# Patient Record
Sex: FEMALE | Race: BLACK OR AFRICAN AMERICAN | NOT HISPANIC OR LATINO | Employment: OTHER | URBAN - METROPOLITAN AREA
[De-identification: names, ages, dates, MRNs, and addresses within clinical notes are randomized per-mention and may not be internally consistent; named-entity substitution may affect disease eponyms.]

---

## 2023-05-30 ENCOUNTER — HOSPITAL ENCOUNTER (EMERGENCY)
Facility: HOSPITAL | Age: 58
Discharge: HOME | End: 2023-05-30
Attending: EMERGENCY MEDICINE | Admitting: EMERGENCY MEDICINE

## 2023-05-30 ENCOUNTER — APPOINTMENT (OUTPATIENT)
Dept: GENERAL RADIOLOGY | Facility: HOSPITAL | Age: 58
End: 2023-05-30

## 2023-05-30 VITALS
BODY MASS INDEX: 28.12 KG/M2 | OXYGEN SATURATION: 97 % | WEIGHT: 175 LBS | HEIGHT: 66 IN | TEMPERATURE: 97.2 F | DIASTOLIC BLOOD PRESSURE: 88 MMHG | HEART RATE: 84 BPM | RESPIRATION RATE: 16 BRPM | SYSTOLIC BLOOD PRESSURE: 137 MMHG

## 2023-05-30 DIAGNOSIS — E78.5 HYPERLIPIDEMIA, UNSPECIFIED HYPERLIPIDEMIA TYPE: ICD-10-CM

## 2023-05-30 DIAGNOSIS — E11.9 CONTROLLED TYPE 2 DIABETES MELLITUS WITHOUT COMPLICATION, UNSPECIFIED WHETHER LONG TERM INSULIN USE: ICD-10-CM

## 2023-05-30 DIAGNOSIS — R07.9 CHEST PAIN, UNSPECIFIED TYPE: Primary | ICD-10-CM

## 2023-05-30 DIAGNOSIS — I10 HYPERTENSION, UNSPECIFIED TYPE: ICD-10-CM

## 2023-05-30 LAB
ALBUMIN SERPL-MCNC: 4.2 G/DL (ref 3.5–5.2)
ALBUMIN/GLOB SERPL: 1.6 G/DL
ALP SERPL-CCNC: 68 U/L (ref 39–117)
ALT SERPL W P-5'-P-CCNC: 18 U/L (ref 1–33)
ANION GAP SERPL CALCULATED.3IONS-SCNC: 10.5 MMOL/L (ref 5–15)
AST SERPL-CCNC: 18 U/L (ref 1–32)
BASOPHILS # BLD AUTO: 0.05 10*3/MM3 (ref 0–0.2)
BASOPHILS NFR BLD AUTO: 0.5 % (ref 0–1.5)
BILIRUB SERPL-MCNC: 0.2 MG/DL (ref 0–1.2)
BUN SERPL-MCNC: 19 MG/DL (ref 6–20)
BUN/CREAT SERPL: 31.7 (ref 7–25)
CALCIUM SPEC-SCNC: 9.9 MG/DL (ref 8.6–10.5)
CHLORIDE SERPL-SCNC: 105 MMOL/L (ref 98–107)
CO2 SERPL-SCNC: 25.5 MMOL/L (ref 22–29)
CREAT SERPL-MCNC: 0.6 MG/DL (ref 0.57–1)
DEPRECATED RDW RBC AUTO: 46 FL (ref 37–54)
EGFRCR SERPLBLD CKD-EPI 2021: 104.2 ML/MIN/1.73
EOSINOPHIL # BLD AUTO: 0.19 10*3/MM3 (ref 0–0.4)
EOSINOPHIL NFR BLD AUTO: 1.9 % (ref 0.3–6.2)
ERYTHROCYTE [DISTWIDTH] IN BLOOD BY AUTOMATED COUNT: 15.2 % (ref 12.3–15.4)
GEN 5 2HR TROPONIN T REFLEX: 15 NG/L
GLOBULIN UR ELPH-MCNC: 2.6 GM/DL
GLUCOSE SERPL-MCNC: 144 MG/DL (ref 65–99)
HCT VFR BLD AUTO: 35 % (ref 34–46.6)
HGB BLD-MCNC: 11.6 G/DL (ref 12–15.9)
HOLD SPECIMEN: NORMAL
IMM GRANULOCYTES # BLD AUTO: 0.04 10*3/MM3 (ref 0–0.05)
IMM GRANULOCYTES NFR BLD AUTO: 0.4 % (ref 0–0.5)
LYMPHOCYTES # BLD AUTO: 2.69 10*3/MM3 (ref 0.7–3.1)
LYMPHOCYTES NFR BLD AUTO: 27.6 % (ref 19.6–45.3)
MCH RBC QN AUTO: 27.8 PG (ref 26.6–33)
MCHC RBC AUTO-ENTMCNC: 33.1 G/DL (ref 31.5–35.7)
MCV RBC AUTO: 83.7 FL (ref 79–97)
MONOCYTES # BLD AUTO: 0.52 10*3/MM3 (ref 0.1–0.9)
MONOCYTES NFR BLD AUTO: 5.3 % (ref 5–12)
NEUTROPHILS NFR BLD AUTO: 6.26 10*3/MM3 (ref 1.7–7)
NEUTROPHILS NFR BLD AUTO: 64.3 % (ref 42.7–76)
NRBC BLD AUTO-RTO: 0 /100 WBC (ref 0–0.2)
NT-PROBNP SERPL-MCNC: 14 PG/ML (ref 0–900)
PLATELET # BLD AUTO: 421 10*3/MM3 (ref 140–450)
PMV BLD AUTO: 10 FL (ref 6–12)
POTASSIUM SERPL-SCNC: 4 MMOL/L (ref 3.5–5.2)
PROT SERPL-MCNC: 6.8 G/DL (ref 6–8.5)
QT INTERVAL: 370 MS
RBC # BLD AUTO: 4.18 10*6/MM3 (ref 3.77–5.28)
SODIUM SERPL-SCNC: 141 MMOL/L (ref 136–145)
TROPONIN T DELTA: 5 NG/L
TROPONIN T SERPL HS-MCNC: 10 NG/L
WBC NRBC COR # BLD: 9.75 10*3/MM3 (ref 3.4–10.8)
WHOLE BLOOD HOLD COAG: NORMAL

## 2023-05-30 PROCEDURE — 36415 COLL VENOUS BLD VENIPUNCTURE: CPT

## 2023-05-30 PROCEDURE — 93005 ELECTROCARDIOGRAM TRACING: CPT | Performed by: EMERGENCY MEDICINE

## 2023-05-30 PROCEDURE — 71045 X-RAY EXAM CHEST 1 VIEW: CPT

## 2023-05-30 PROCEDURE — 83880 ASSAY OF NATRIURETIC PEPTIDE: CPT | Performed by: EMERGENCY MEDICINE

## 2023-05-30 PROCEDURE — 99284 EMERGENCY DEPT VISIT MOD MDM: CPT

## 2023-05-30 PROCEDURE — 85025 COMPLETE CBC W/AUTO DIFF WBC: CPT | Performed by: EMERGENCY MEDICINE

## 2023-05-30 PROCEDURE — 84484 ASSAY OF TROPONIN QUANT: CPT | Performed by: EMERGENCY MEDICINE

## 2023-05-30 PROCEDURE — 80053 COMPREHEN METABOLIC PANEL: CPT | Performed by: EMERGENCY MEDICINE

## 2023-05-30 RX ORDER — SODIUM CHLORIDE 0.9 % (FLUSH) 0.9 %
10 SYRINGE (ML) INJECTION AS NEEDED
Status: DISCONTINUED | OUTPATIENT
Start: 2023-05-30 | End: 2023-05-30 | Stop reason: HOSPADM

## 2023-05-30 NOTE — ED TRIAGE NOTES
"Pt comes to ER for palpitations with CP since yesterday. Pt states she has a hx of \"irregular\" rhythym.   "

## 2023-05-30 NOTE — ED PROVIDER NOTES
EMERGENCY DEPARTMENT ENCOUNTER    Room Number:  37/37  Date seen:  5/30/2023  PCP: Provider, No Known  Historian: Patient      HPI:  Chief Complaint: Chest pain  A complete HPI/ROS/PMH/PSH/SH/FH are unobtainable due to: Nothing  Context: Amaya Zavala is a 57 y.o. female who presents to the ED c/o chest discomfort that woke her from sleep this morning.  She reports that she had associated nausea and also felt short of breath.  Patient reports a history of hypertension, hyperlipidemia, diabetes.  She is not a smoker.  She does have a strong family history of coronary disease including 2 siblings.  She reports that the pain has resolved but she is still having some intermittent palpitations.  She tries to exercise regularly.  She reports that she actually did pretty strenuous exercise yesterday including some jumping jacks and lunges.  She denies having any exertional dyspnea or chest pain with her exercise routine.             PAST MEDICAL HISTORY  Active Ambulatory Problems     Diagnosis Date Noted   • No Active Ambulatory Problems     Resolved Ambulatory Problems     Diagnosis Date Noted   • No Resolved Ambulatory Problems     No Additional Past Medical History         PAST SURGICAL HISTORY  No past surgical history on file.      FAMILY HISTORY  No family history on file.      SOCIAL HISTORY  Social History     Socioeconomic History   • Marital status:          ALLERGIES  Ceclor [cefaclor], Lisinopril, and Penicillins        REVIEW OF SYSTEMS  Review of Systems   Review of all 14 systems is negative other than stated in the HPI above.      PHYSICAL EXAM  ED Triage Vitals   Temp Heart Rate Resp BP SpO2   05/30/23 1032 05/30/23 1032 05/30/23 1032 05/30/23 1048 05/30/23 1032   97.2 °F (36.2 °C) 98 18 132/88 97 %      Temp src Heart Rate Source Patient Position BP Location FiO2 (%)   -- -- -- -- --              Physical Exam      GENERAL: Awake and alert, well-appearing, no acute distress  HENT: lucho  patent  EYES: no scleral icterus, EOMI  CV: regular rhythm, normal rate, no murmur, no peripheral edema  RESPIRATORY: normal effort, lungs clear bilaterally  ABDOMEN: soft, nondistended, nontender throughout  MUSCULOSKELETAL: no deformity, no calf tenderness present bilaterally  NEURO: alert, moves all extremities, follows commands, cranial nerves II through XII intact  PSYCH:  calm, cooperative  SKIN: warm, dry    Vital signs and nursing notes reviewed.          LAB RESULTS  Recent Results (from the past 24 hour(s))   ECG 12 Lead Rhythm Change    Collection Time: 05/30/23 10:40 AM   Result Value Ref Range    QT Interval 370 ms   Comprehensive Metabolic Panel    Collection Time: 05/30/23 10:47 AM    Specimen: Blood   Result Value Ref Range    Glucose 144 (H) 65 - 99 mg/dL    BUN 19 6 - 20 mg/dL    Creatinine 0.60 0.57 - 1.00 mg/dL    Sodium 141 136 - 145 mmol/L    Potassium 4.0 3.5 - 5.2 mmol/L    Chloride 105 98 - 107 mmol/L    CO2 25.5 22.0 - 29.0 mmol/L    Calcium 9.9 8.6 - 10.5 mg/dL    Total Protein 6.8 6.0 - 8.5 g/dL    Albumin 4.2 3.5 - 5.2 g/dL    ALT (SGPT) 18 1 - 33 U/L    AST (SGOT) 18 1 - 32 U/L    Alkaline Phosphatase 68 39 - 117 U/L    Total Bilirubin 0.2 0.0 - 1.2 mg/dL    Globulin 2.6 gm/dL    A/G Ratio 1.6 g/dL    BUN/Creatinine Ratio 31.7 (H) 7.0 - 25.0    Anion Gap 10.5 5.0 - 15.0 mmol/L    eGFR 104.2 >60.0 mL/min/1.73   BNP    Collection Time: 05/30/23 10:47 AM    Specimen: Blood   Result Value Ref Range    proBNP 14.0 0.0 - 900.0 pg/mL   High Sensitivity Troponin T    Collection Time: 05/30/23 10:47 AM    Specimen: Blood   Result Value Ref Range    HS Troponin T 10 (H) <10 ng/L   CBC Auto Differential    Collection Time: 05/30/23 10:47 AM    Specimen: Blood   Result Value Ref Range    WBC 9.75 3.40 - 10.80 10*3/mm3    RBC 4.18 3.77 - 5.28 10*6/mm3    Hemoglobin 11.6 (L) 12.0 - 15.9 g/dL    Hematocrit 35.0 34.0 - 46.6 %    MCV 83.7 79.0 - 97.0 fL    MCH 27.8 26.6 - 33.0 pg    MCHC 33.1 31.5 -  35.7 g/dL    RDW 15.2 12.3 - 15.4 %    RDW-SD 46.0 37.0 - 54.0 fl    MPV 10.0 6.0 - 12.0 fL    Platelets 421 140 - 450 10*3/mm3    Neutrophil % 64.3 42.7 - 76.0 %    Lymphocyte % 27.6 19.6 - 45.3 %    Monocyte % 5.3 5.0 - 12.0 %    Eosinophil % 1.9 0.3 - 6.2 %    Basophil % 0.5 0.0 - 1.5 %    Immature Grans % 0.4 0.0 - 0.5 %    Neutrophils, Absolute 6.26 1.70 - 7.00 10*3/mm3    Lymphocytes, Absolute 2.69 0.70 - 3.10 10*3/mm3    Monocytes, Absolute 0.52 0.10 - 0.90 10*3/mm3    Eosinophils, Absolute 0.19 0.00 - 0.40 10*3/mm3    Basophils, Absolute 0.05 0.00 - 0.20 10*3/mm3    Immature Grans, Absolute 0.04 0.00 - 0.05 10*3/mm3    nRBC 0.0 0.0 - 0.2 /100 WBC   Gold Top - SST    Collection Time: 05/30/23 10:47 AM   Result Value Ref Range    Extra Tube Hold for add-ons.    Light Blue Top    Collection Time: 05/30/23 10:47 AM   Result Value Ref Range    Extra Tube Hold for add-ons.    High Sensitivity Troponin T 2Hr    Collection Time: 05/30/23 12:53 PM    Specimen: Blood   Result Value Ref Range    HS Troponin T 15 (H) <10 ng/L    Troponin T Delta 5 (C) >=-4 - <+4 ng/L       Ordered the above labs and reviewed the results.        RADIOLOGY  XR Chest 1 View    Result Date: 5/30/2023  XR CHEST 1 VW-  HISTORY: Female who is 57 years-old,  chest pain  TECHNIQUE: Frontal view of the chest  COMPARISON: None available  FINDINGS: Heart size is normal. Aorta is calcified. Pulmonary vasculature is unremarkable.. Minimal likely atelectasis or scarring the lower lungs. No focal pulmonary consolidation, pleural effusion, or pneumothorax. No acute osseous process.      Minimal likely atelectasis or scarring in the lower lungs. Follow-up as clinical indications persist.  This report was finalized on 5/30/2023 12:15 PM by Dr. Ethan Keane M.D.        Ordered the above noted radiological studies. Reviewed by me in PACS.            PROCEDURES  Procedures              MEDICATIONS GIVEN IN ER  Medications   sodium chloride 0.9 % flush  10 mL (has no administration in time range)                   MEDICAL DECISION MAKING, PROGRESS, and CONSULTS    All labs have been independently reviewed by me.  All radiology studies have been reviewed by me and I have also reviewed the radiology report.   EKG's independently viewed and interpreted by me.  Discussion below represents my analysis of pertinent findings related to patient's condition, differential diagnosis, treatment plan and final disposition.          Orders placed during this visit:  Orders Placed This Encounter   Procedures   • XR Chest 1 View   • Comprehensive Metabolic Panel   • BNP   • High Sensitivity Troponin T   • CBC Auto Differential   • High Sensitivity Troponin T 2Hr   • Ambulatory Referral to Cardiology   • Pulse Oximetry, Continuous   • Monitor Blood Pressure   • ECG 12 Lead Rhythm Change   • Insert Peripheral IV   • CBC & Differential   • Extra Tubes   • Gold Top - SST   • Light Blue Top             Differential diagnosis includes but is not limited to:    Acute coronary syndrome  Spontaneous coronary artery dissection  GERD  Pneumonia  Pulmonary embolus  Pneumothorax        Independent interpretation of labs, radiology studies, and discussions with consultants:  ED Course as of 05/30/23 1555   Tue May 30, 2023   1218 EKG          EKG time: 10:40 AM  Rhythm/Rate: Sinus rhythm, 76  P waves and GA: Normal  QRS, axis: Normal axis  ST and T waves: No acute ischemic changes    Interpreted Contemporaneously by me, independently viewed         [JR]   1221 HEART score 4 due to risk factors, age, and history. [JR]   1325 HS Troponin T(!): 15 [JR]   1325 Troponin T Delta(!!): 5 [JR]   1345 Patient reassessed.  I explained that her repeat troponin had gone up slightly and that I do think that she needs further evaluation by cardiology for her symptoms although she is pain-free at this time.  I offered admission to the observation unit for further evaluation tomorrow.  Patient would really like  to go home and carry out further testing as outpatient if possible.  I explained that I can refer her for further evaluation as outpatient but it may take up to 1 week to have her evaluated and that the quickest method would be admission to the observation unit.  Considering that her pain has resolved, I do not think is unreasonable for her to follow-up as outpatient, so long as she realizes that any recurrent discomfort, nausea, diaphoresis, or dyspnea would be an indication to return to the ER immediately.  She voiced understanding of these return precautions and agrees to follow-up closely with cardiology.  Referral will be placed. [JR]      ED Course User Index  [JR] Skyler Keyes MD                 DIAGNOSIS  Final diagnoses:   Chest pain, unspecified type   Controlled type 2 diabetes mellitus without complication, unspecified whether long term insulin use   Hypertension, unspecified type   Hyperlipidemia, unspecified hyperlipidemia type         DISPOSITION  DISCHARGE    Patient discharged in stable condition.    Reviewed implications of results, diagnosis, meds, responsibility to follow up, warning signs and symptoms of possible worsening, potential complications and reasons to return to ER.    Patient/Family voiced understanding of above instructions.    Discussed plan for discharge, as there is no emergent indication for admission. Patient referred to primary care provider for BP management due to today's BP. Pt/family is agreeable and understands need for follow up and repeat testing.  Pt is aware that discharge does not mean that nothing is wrong but it indicates no emergency is present that requires admission and they must continue care with follow-up as given below or physician of their choice.     FOLLOW-UP  [facility] CARDIOLOGY  [address]  [facility] [address] [phone]  Schedule an appointment as soon as possible for a visit            Medication  List      No changes were made to your prescriptions during this visit.                   Latest Documented Vital Signs:  As of 15:55 EDT  BP- 137/88 HR- 84 Temp- 97.2 °F (36.2 °C) O2 sat- 97%              --    Please note that portions of this were completed with a voice recognition program.       Note Disclaimer: At [facility], we believe that sharing information builds trust and better relationships. You are receiving this note because you are receiving care at [facility] or recently visited. It is possible you will see health information before a provider has talked with you about it. This kind of information can be easy to misunderstand. To help you fully understand what it means for your health, we urge you to discuss this note with your provider.           Skyler Keyes MD  05/30/23 5967

## 2023-06-19 PROBLEM — I10 HYPERTENSIVE DISORDER: Status: ACTIVE | Noted: 2020-12-18

## 2023-06-19 PROBLEM — Z12.39 SCREENING FOR BREAST CANCER: Status: ACTIVE | Noted: 2021-02-15

## 2023-06-19 PROBLEM — M77.8 LEFT SHOULDER TENDINITIS: Status: ACTIVE | Noted: 2017-01-05

## 2023-06-19 PROBLEM — M77.8 TENDINITIS OF LEFT SHOULDER: Status: ACTIVE | Noted: 2017-01-05

## 2023-06-19 PROBLEM — M70.61 TROCHANTERIC BURSITIS OF RIGHT HIP: Status: ACTIVE | Noted: 2017-06-05

## 2023-06-19 PROBLEM — R25.2 SPASM: Status: ACTIVE | Noted: 2021-02-15

## 2023-06-19 PROBLEM — Z91.148: Status: ACTIVE | Noted: 2021-03-19

## 2023-06-19 PROBLEM — J45.909 ASTHMA: Status: ACTIVE | Noted: 2020-12-18

## 2023-06-19 PROBLEM — F32.A DEPRESSIVE DISORDER: Status: ACTIVE | Noted: 2021-02-16

## 2023-06-30 PROBLEM — R07.2 PRECORDIAL PAIN: Status: ACTIVE | Noted: 2023-06-30

## 2023-11-07 NOTE — H&P
Chief Complaint  Right follow-up  Patient's Care Team  Primary Care Provider (Primary Insurance): JOHANN CHILEL: [address] [place] [address], Ph [phone]  Primary Care Provider: [facility]: [address] [place] [address], Ph [phone], Fax [phone]  Patient's Pharmacies  [facility] (ERX): [address] [facility] ([place] [address], Ph [phone], Fax [phone]  Vitals  2023-09-29 08:08  Ht: 5 ft 6 in  Allergies  Allergies not reviewed (last reviewed 09/14/2023)  CHRISTAL high criticality: Rash (Severe)   LISINOPRIL, high criticality: Rash (Severe)   SULFA (SULFONAMIDE ANTIBIOTICS): - Critical  Category: Drug   Uncoded Allergies  SEAFOOD (Critical) 11/18/2021 (Active)  Medications  Medications not reviewed (last reviewed 09/14/2023)  Advair Diskus 250 mcg-50 mcg/dose powder for inhalation  INHALE 1 DOSE BY MOUTH TWICE DAILY IN THE MORNING AND EVENING APPROXIMATELY 12 HOURS APART.  09/12/23   filled surescripts  albuterol sulfate HFA 90 mcg/actuation aerosol inhaler  INHALE 2 PUFFS BY MOUTH EVERY 4 TO 6 HOURS AS NEEDED  08/15/23   filled surescripts  aspirin 81 mg tablet,delayed release  TAKE 1 TABLET BY MOUTH ONCE DAILY  08/15/23   filled surescripts  cetirizine 10 mg tablet  TAKE 1 TABLET BY MOUTH ONCE DAILY  09/12/23   filled surescripts  cholecalciferol (vitamin D3) 25 mcg (1,000 unit) tablet  TAKE 1 TABLET BY MOUTH ONCE DAILY  09/17/23   filled surescripts  diazePAM 5 mg tablet  TAKE 1 TABLET BY MOUTH 1 HOUR PRIOR TO APPT  09/15/23   filled surescripts  diclofenac 1 % topical gel  APPLY 2 GRAMS TOPICALLY TO AFFECTED AREAS FOUR TIMES DAILY.  08/15/23   filled surescripts  fluticasone propionate 50 mcg/actuation nasal spray,suspension  INSTILL 1 SPRAY IN EACH NOSTRIL EVERY DAY AS NEEDED  03/03/22   filled surescripts  FreeStyle Lancets 28 gauge  TEST TWICE DAILY  03/03/22   filled surescripts  ibuprofen 400 mg tablet  TAKE 1 TABLET  BY MOUTH EVERY 6 HOURS AS NEEDED  03/09/22   filled surescripts  Jardiance 10 mg tablet  TAKE 1 TABLET BY MOUTH ONCE DAILY IN THE MORNING  09/25/23   filled surescripts  losartan 25 mg tablet  TAKE 1 TABLET BY MOUTH ONCE DAILY  08/15/23   filled surescripts  meloxicam 15 mg tablet  TAKE 1 TABLET BY MOUTH ONCE DAILY  09/14/23   filled surescripts  metFORMIN 1,000 mg tablet  TAKE 1 TABLET BY MOUTH TWICE DAILY WITH MORNING MEAL AND WITH EVENING MEAL  09/01/23   filled surescripts  naproxen 500 mg tablet  TAKE 1 TABLET BY MOUTH EVERY 12 HOURS AS NEEDED FOR PAIN  10/27/21   filled surescripts  nitrofurantoin monohydrate/macrocrystals 100 mg capsule  TAKE 1 CAPSULE BY MOUTH TWICE DAILY  07/07/21   filled surescripts  omeprazole 20 mg capsule,delayed release  TAKE 1 CAPSULE BY MOUTH EVERY DAY 30 MINUTES BEFORE A MEAL  09/05/23   filled surescripts  OneTouch Delica Plus Lancet 33 gauge  01/12/23   filled surescripts  OneTouch Verio Flex Meter  01/12/23   filled surescripts  OneTouch Verio test strips  USE TO TEST BLOOD SUGAR TWICE DAILY  01/12/23   filled surescripts  simvastatin 20 mg tablet  TAKE 1 TABLET BY MOUTH ONCE DAILY IN THE EVENING  08/18/23   filled surescripts  terbinafine  mg tablet  08/01/23   filled surescripts  Vaccines  Vaccines not reviewed (last reviewed 09/14/2023)  Vaccine Type Date Amt. Route Site NDC Lot # Mfr. Exp.  Date VIS VIS  Given Vaccinator  Influenza  influenza 11/11/22            Problems  Reviewed Problems  Anxiety - Onset: 09/15/2023  Partial thickness rotator cuff tear - Onset: 09/29/2023, Right  Pain of right shoulder joint - Onset: 04/04/2023  Impingement syndrome of right shoulder region - Onset: 04/04/2023  Family History  Family History not reviewed (last reviewed 09/14/2023)  Mother - Disorder of lung    - Heart disease    - Rheumatoid arthritis  Social History  Social History not reviewed (last reviewed 09/14/2023)  Public Health and Travel  Have you recently traveled  abroad?: No  Have you been to an area known to be high risk for COVID-19?: No  In the 14 days before symptom onset, have you had close contact with a laboratory-confirmed COVID-19 while that case was ill?: No  In the 14 days before symptom onset, have you had close contact with a person who is under investigation for COVID-19 while that person was ill?: No  Substance Use  Do you or have you ever smoked tobacco?: Former smoker  When did you quit smoking?: 16+ years since last cigarette  Do you or have you ever used any other forms of tobacco or nicotine?: No  Has tobacco cessation counseling been provided?: No  What is your level of alcohol consumption?: None  Do you use any illicit or recreational drugs?: No  Advance Directive  Do you have an advance directive?: Yes  Do you have a medical power of ?: Yes  Surgical History  Surgical History not reviewed (last reviewed 09/14/2023)  No surg 8/8/23  GYN History  (not configured)  Past Medical History  Past Medical History not reviewed (last reviewed 09/14/2023)  Asthma: Y  Diabetes: Y - type 2  Gastrointestinal Disease/GERD: Y  Hypertension: Y  HPI  Patient is a 58-year-old female here for evaluation of her right shoulder. The patient is a chief complaint of right shoulder pain is somewhat chronic in nature ongoing for the past 1 year. She had multiple injections and taking an anti-inflammatory with no significant relief. She is here to go over her MRI scan.    The pain is located over the anterolateral shoulder and the patient describes it as a intermittent, dull throbbing pain is worsened with overhead activity, lifting and occasionally at nighttime. The pain is mild to moderate in severity. The patient denies any numbness, tingling or weakness.  Assessment / Plan  MRI scan right shoulder  1. Broad partial-thickness tear of the supraspinatus  2. Impingement morphology    1. Impingement syndrome of right shoulder region  M75.41: Impingement syndrome of right  shoulder    2. Partial thickness rotator cuff tear - Right  M75.111: Incomplete rotator cuff tear or rupture of right shoulder, not specified as traumatic    Patient Instructions  With the current MRI findings and the failure of conservative treatment I would like to move forth a right shoulder arthroscopy for extensive debridement and rotator cuff repair. Today we discussed the procedure in detail. We discussed the need for physical therapy, immobilization with the sling and the expected recovery period. We discussed both the acute and long-term risks of the surgery. All questions were answered and informed consent was obtained.

## 2023-11-21 RX ORDER — SIMVASTATIN 20 MG
20 TABLET ORAL EVERY EVENING
COMMUNITY
Start: 2023-08-18

## 2023-11-21 RX ORDER — DIAZEPAM 5 MG/1
5 TABLET ORAL AS NEEDED
Status: ON HOLD | COMMUNITY
Start: 2023-09-15 | End: 2023-11-22

## 2023-11-21 RX ORDER — CHOLECALCIFEROL (VITAMIN D3) 25 MCG
1000 TABLET ORAL DAILY
COMMUNITY
Start: 2023-10-23

## 2023-11-21 NOTE — H&P
Chief Complaint  Right follow-up  Patient's Care Team  Primary Care Provider (Primary Insurance): JOHANN CHILEL: [address] [place] [address], Ph [phone]  Primary Care Provider: [facility]: [address] [place] [address], Ph [phone], Fax [phone]  Patient's Pharmacies  [facility] (ERX): [address] [facility] ([place] [address], Ph [phone], Fax [phone]  Vitals  2023-09-29 08:08  Ht: 5 ft 6 in  Allergies  Allergies not reviewed (last reviewed 09/14/2023)  CHRISTAL high criticality: Rash (Severe)   LISINOPRIL, high criticality: Rash (Severe)   SULFA (SULFONAMIDE ANTIBIOTICS): - Critical  Category: Drug   Uncoded Allergies  SEAFOOD (Critical) 11/18/2021 (Active)  Medications  Medications not reviewed (last reviewed 09/14/2023)  Advair Diskus 250 mcg-50 mcg/dose powder for inhalation  INHALE 1 DOSE BY MOUTH TWICE DAILY IN THE MORNING AND EVENING APPROXIMATELY 12 HOURS APART.  09/12/23   filled surescripts  albuterol sulfate HFA 90 mcg/actuation aerosol inhaler  INHALE 2 PUFFS BY MOUTH EVERY 4 TO 6 HOURS AS NEEDED  08/15/23   filled surescripts  aspirin 81 mg tablet,delayed release  TAKE 1 TABLET BY MOUTH ONCE DAILY  08/15/23   filled surescripts  cetirizine 10 mg tablet  TAKE 1 TABLET BY MOUTH ONCE DAILY  09/12/23   filled surescripts  cholecalciferol (vitamin D3) 25 mcg (1,000 unit) tablet  TAKE 1 TABLET BY MOUTH ONCE DAILY  09/17/23   filled surescripts  diazePAM 5 mg tablet  TAKE 1 TABLET BY MOUTH 1 HOUR PRIOR TO APPT  09/15/23   filled surescripts  diclofenac 1 % topical gel  APPLY 2 GRAMS TOPICALLY TO AFFECTED AREAS FOUR TIMES DAILY.  08/15/23   filled surescripts  fluticasone propionate 50 mcg/actuation nasal spray,suspension  INSTILL 1 SPRAY IN EACH NOSTRIL EVERY DAY AS NEEDED  03/03/22   filled surescripts  FreeStyle Lancets 28 gauge  TEST TWICE DAILY  03/03/22   filled surescripts  ibuprofen 400 mg tablet  TAKE 1 TABLET  BY MOUTH EVERY 6 HOURS AS NEEDED  03/09/22   filled surescripts  Jardiance 10 mg tablet  TAKE 1 TABLET BY MOUTH ONCE DAILY IN THE MORNING  09/25/23   filled surescripts  losartan 25 mg tablet  TAKE 1 TABLET BY MOUTH ONCE DAILY  08/15/23   filled surescripts  meloxicam 15 mg tablet  TAKE 1 TABLET BY MOUTH ONCE DAILY  09/14/23   filled surescripts  metFORMIN 1,000 mg tablet  TAKE 1 TABLET BY MOUTH TWICE DAILY WITH MORNING MEAL AND WITH EVENING MEAL  09/01/23   filled surescripts  naproxen 500 mg tablet  TAKE 1 TABLET BY MOUTH EVERY 12 HOURS AS NEEDED FOR PAIN  10/27/21   filled surescripts  nitrofurantoin monohydrate/macrocrystals 100 mg capsule  TAKE 1 CAPSULE BY MOUTH TWICE DAILY  07/07/21   filled surescripts  omeprazole 20 mg capsule,delayed release  TAKE 1 CAPSULE BY MOUTH EVERY DAY 30 MINUTES BEFORE A MEAL  09/05/23   filled surescripts  OneTouch Delica Plus Lancet 33 gauge  01/12/23   filled surescripts  OneTouch Verio Flex Meter  01/12/23   filled surescripts  OneTouch Verio test strips  USE TO TEST BLOOD SUGAR TWICE DAILY  01/12/23   filled surescripts  simvastatin 20 mg tablet  TAKE 1 TABLET BY MOUTH ONCE DAILY IN THE EVENING  08/18/23   filled surescripts  terbinafine  mg tablet  08/01/23   filled surescripts  Vaccines  Vaccines not reviewed (last reviewed 09/14/2023)  Vaccine Type Date Amt. Route Site NDC Lot # Mfr. Exp.  Date VIS VIS  Given Vaccinator  Influenza  influenza 11/11/22            Problems  Reviewed Problems  Anxiety - Onset: 09/15/2023  Partial thickness rotator cuff tear - Onset: 09/29/2023, Right  Pain of right shoulder joint - Onset: 04/04/2023  Impingement syndrome of right shoulder region - Onset: 04/04/2023  Family History  Family History not reviewed (last reviewed 09/14/2023)  Mother - Disorder of lung    - Heart disease    - Rheumatoid arthritis  Social History  Social History not reviewed (last reviewed 09/14/2023)  Public Health and Travel  Have you recently traveled  abroad?: No  Have you been to an area known to be high risk for COVID-19?: No  In the 14 days before symptom onset, have you had close contact with a laboratory-confirmed COVID-19 while that case was ill?: No  In the 14 days before symptom onset, have you had close contact with a person who is under investigation for COVID-19 while that person was ill?: No  Substance Use  Do you or have you ever smoked tobacco?: Former smoker  When did you quit smoking?: 16+ years since last cigarette  Do you or have you ever used any other forms of tobacco or nicotine?: No  Has tobacco cessation counseling been provided?: No  What is your level of alcohol consumption?: None  Do you use any illicit or recreational drugs?: No  Advance Directive  Do you have an advance directive?: Yes  Do you have a medical power of ?: Yes  Surgical History  Surgical History not reviewed (last reviewed 09/14/2023)  No surg 8/8/23  GYN History  (not configured)  Past Medical History  Past Medical History not reviewed (last reviewed 09/14/2023)  Asthma: Y  Diabetes: Y - type 2  Gastrointestinal Disease/GERD: Y  Hypertension: Y  HPI  Patient is a 58-year-old female here for evaluation of her right shoulder. The patient is a chief complaint of right shoulder pain is somewhat chronic in nature ongoing for the past 1 year. She had multiple injections and taking an anti-inflammatory with no significant relief. She is here to go over her MRI scan.    The pain is located over the anterolateral shoulder and the patient describes it as a intermittent, dull throbbing pain is worsened with overhead activity, lifting and occasionally at nighttime. The pain is mild to moderate in severity. The patient denies any numbness, tingling or weakness.  Assessment / Plan  MRI scan right shoulder  1. Broad partial-thickness tear of the supraspinatus  2. Impingement morphology    1. Impingement syndrome of right shoulder region  M75.41: Impingement syndrome of right  shoulder    2. Partial thickness rotator cuff tear - Right  M75.111: Incomplete rotator cuff tear or rupture of right shoulder, not specified as traumatic    Patient Instructions  With the current MRI findings and the failure of conservative treatment I would like to move forth a right shoulder arthroscopy for extensive debridement and rotator cuff repair. Today we discussed the procedure in detail. We discussed the need for physical therapy, immobilization with the sling and the expected recovery period. We discussed both the acute and long-term risks of the surgery. All questions were answered and informed consent was obtained.

## 2023-11-22 ENCOUNTER — ANESTHESIA EVENT (OUTPATIENT)
Dept: PERIOP | Facility: HOSPITAL | Age: 58
End: 2023-11-22
Payer: MEDICAID

## 2023-11-22 ENCOUNTER — HOSPITAL ENCOUNTER (OUTPATIENT)
Facility: HOSPITAL | Age: 58
Setting detail: HOSPITAL OUTPATIENT SURGERY
Discharge: HOME | End: 2023-11-22
Attending: ORTHOPAEDIC SURGERY | Admitting: ORTHOPAEDIC SURGERY
Payer: MEDICAID

## 2023-11-22 ENCOUNTER — ANESTHESIA (OUTPATIENT)
Dept: PERIOP | Facility: HOSPITAL | Age: 58
End: 2023-11-22
Payer: MEDICAID

## 2023-11-22 VITALS
TEMPERATURE: 98.5 F | RESPIRATION RATE: 18 BRPM | SYSTOLIC BLOOD PRESSURE: 132 MMHG | BODY MASS INDEX: 28.25 KG/M2 | WEIGHT: 175 LBS | DIASTOLIC BLOOD PRESSURE: 84 MMHG | OXYGEN SATURATION: 94 % | HEART RATE: 102 BPM

## 2023-11-22 LAB — GLUCOSE BLDC GLUCOMTR-MCNC: 168 MG/DL (ref 70–130)

## 2023-11-22 PROCEDURE — 25010000002 PROPOFOL 200 MG/20ML EMULSION: Performed by: NURSE ANESTHETIST, CERTIFIED REGISTERED

## 2023-11-22 PROCEDURE — 25810000003 LACTATED RINGERS PER 1000 ML: Performed by: STUDENT IN AN ORGANIZED HEALTH CARE EDUCATION/TRAINING PROGRAM

## 2023-11-22 PROCEDURE — 25010000002 EPINEPHRINE PER 0.1 MG: Performed by: ORTHOPAEDIC SURGERY

## 2023-11-22 PROCEDURE — 25010000002 FENTANYL CITRATE (PF) 50 MCG/ML SOLUTION: Performed by: STUDENT IN AN ORGANIZED HEALTH CARE EDUCATION/TRAINING PROGRAM

## 2023-11-22 PROCEDURE — 25010000002 MIDAZOLAM PER 1 MG: Performed by: STUDENT IN AN ORGANIZED HEALTH CARE EDUCATION/TRAINING PROGRAM

## 2023-11-22 PROCEDURE — 82948 REAGENT STRIP/BLOOD GLUCOSE: CPT

## 2023-11-22 PROCEDURE — 25010000002 ONDANSETRON PER 1 MG: Performed by: NURSE ANESTHETIST, CERTIFIED REGISTERED

## 2023-11-22 PROCEDURE — 25010000002 CLINDAMYCIN 900 MG/50ML SOLUTION: Performed by: ORTHOPAEDIC SURGERY

## 2023-11-22 PROCEDURE — 25810000003 LACTATED RINGERS PER 1000 ML: Performed by: ORTHOPAEDIC SURGERY

## 2023-11-22 PROCEDURE — 25010000002 ROPIVACAINE PER 1 MG: Performed by: STUDENT IN AN ORGANIZED HEALTH CARE EDUCATION/TRAINING PROGRAM

## 2023-11-22 PROCEDURE — L3670 SO ACRO/CLAV CAN WEB PRE OTS: HCPCS | Performed by: ORTHOPAEDIC SURGERY

## 2023-11-22 PROCEDURE — C1713 ANCHOR/SCREW BN/BN,TIS/BN: HCPCS | Performed by: ORTHOPAEDIC SURGERY

## 2023-11-22 PROCEDURE — 25010000002 DEXAMETHASONE SODIUM PHOSPHATE 20 MG/5ML SOLUTION: Performed by: STUDENT IN AN ORGANIZED HEALTH CARE EDUCATION/TRAINING PROGRAM

## 2023-11-22 DEVICE — IMPLANTABLE DEVICE
Type: IMPLANTABLE DEVICE | Site: SHOULDER | Status: FUNCTIONAL
Brand: JUGGERKNOT SOFT ANCHORS

## 2023-11-22 RX ORDER — LABETALOL HYDROCHLORIDE 5 MG/ML
5 INJECTION, SOLUTION INTRAVENOUS
Status: DISCONTINUED | OUTPATIENT
Start: 2023-11-22 | End: 2023-11-22 | Stop reason: HOSPADM

## 2023-11-22 RX ORDER — PROMETHAZINE HYDROCHLORIDE 25 MG/1
25 SUPPOSITORY RECTAL ONCE AS NEEDED
Status: DISCONTINUED | OUTPATIENT
Start: 2023-11-22 | End: 2023-11-22 | Stop reason: HOSPADM

## 2023-11-22 RX ORDER — MIDAZOLAM HYDROCHLORIDE 1 MG/ML
1 INJECTION INTRAMUSCULAR; INTRAVENOUS
Status: DISCONTINUED | OUTPATIENT
Start: 2023-11-22 | End: 2023-11-22 | Stop reason: HOSPADM

## 2023-11-22 RX ORDER — DROPERIDOL 2.5 MG/ML
0.62 INJECTION, SOLUTION INTRAMUSCULAR; INTRAVENOUS
Status: DISCONTINUED | OUTPATIENT
Start: 2023-11-22 | End: 2023-11-22 | Stop reason: HOSPADM

## 2023-11-22 RX ORDER — SODIUM CHLORIDE 0.9 % (FLUSH) 0.9 %
3 SYRINGE (ML) INJECTION EVERY 12 HOURS SCHEDULED
Status: DISCONTINUED | OUTPATIENT
Start: 2023-11-22 | End: 2023-11-22 | Stop reason: HOSPADM

## 2023-11-22 RX ORDER — DEXAMETHASONE SODIUM PHOSPHATE 4 MG/ML
INJECTION, SOLUTION INTRA-ARTICULAR; INTRALESIONAL; INTRAMUSCULAR; INTRAVENOUS; SOFT TISSUE
Status: COMPLETED | OUTPATIENT
Start: 2023-11-22 | End: 2023-11-22

## 2023-11-22 RX ORDER — DIPHENHYDRAMINE HYDROCHLORIDE 50 MG/ML
12.5 INJECTION INTRAMUSCULAR; INTRAVENOUS
Status: DISCONTINUED | OUTPATIENT
Start: 2023-11-22 | End: 2023-11-22 | Stop reason: HOSPADM

## 2023-11-22 RX ORDER — OXYCODONE HYDROCHLORIDE AND ACETAMINOPHEN 5; 325 MG/1; MG/1
1 TABLET ORAL EVERY 4 HOURS PRN
Qty: 40 TABLET | Refills: 0 | Status: SHIPPED | OUTPATIENT
Start: 2023-11-22

## 2023-11-22 RX ORDER — FENTANYL CITRATE 50 UG/ML
50 INJECTION, SOLUTION INTRAMUSCULAR; INTRAVENOUS
Status: DISCONTINUED | OUTPATIENT
Start: 2023-11-22 | End: 2023-11-22 | Stop reason: HOSPADM

## 2023-11-22 RX ORDER — FLUMAZENIL 0.1 MG/ML
0.2 INJECTION INTRAVENOUS AS NEEDED
Status: DISCONTINUED | OUTPATIENT
Start: 2023-11-22 | End: 2023-11-22 | Stop reason: HOSPADM

## 2023-11-22 RX ORDER — ROPIVACAINE HYDROCHLORIDE 5 MG/ML
INJECTION, SOLUTION EPIDURAL; INFILTRATION; PERINEURAL
Status: COMPLETED | OUTPATIENT
Start: 2023-11-22 | End: 2023-11-22

## 2023-11-22 RX ORDER — FAMOTIDINE 10 MG/ML
20 INJECTION, SOLUTION INTRAVENOUS ONCE
Status: COMPLETED | OUTPATIENT
Start: 2023-11-22 | End: 2023-11-22

## 2023-11-22 RX ORDER — EPHEDRINE SULFATE 50 MG/ML
5 INJECTION, SOLUTION INTRAVENOUS ONCE AS NEEDED
Status: DISCONTINUED | OUTPATIENT
Start: 2023-11-22 | End: 2023-11-22 | Stop reason: HOSPADM

## 2023-11-22 RX ORDER — SODIUM CHLORIDE 0.9 % (FLUSH) 0.9 %
3-10 SYRINGE (ML) INJECTION AS NEEDED
Status: DISCONTINUED | OUTPATIENT
Start: 2023-11-22 | End: 2023-11-22 | Stop reason: HOSPADM

## 2023-11-22 RX ORDER — IPRATROPIUM BROMIDE AND ALBUTEROL SULFATE 2.5; .5 MG/3ML; MG/3ML
3 SOLUTION RESPIRATORY (INHALATION) ONCE AS NEEDED
Status: DISCONTINUED | OUTPATIENT
Start: 2023-11-22 | End: 2023-11-22 | Stop reason: HOSPADM

## 2023-11-22 RX ORDER — NALOXONE HCL 0.4 MG/ML
0.2 VIAL (ML) INJECTION AS NEEDED
Status: DISCONTINUED | OUTPATIENT
Start: 2023-11-22 | End: 2023-11-22 | Stop reason: HOSPADM

## 2023-11-22 RX ORDER — FENTANYL CITRATE 50 UG/ML
50 INJECTION, SOLUTION INTRAMUSCULAR; INTRAVENOUS ONCE AS NEEDED
Status: COMPLETED | OUTPATIENT
Start: 2023-11-22 | End: 2023-11-22

## 2023-11-22 RX ORDER — SODIUM CHLORIDE, SODIUM LACTATE, POTASSIUM CHLORIDE, CALCIUM CHLORIDE 600; 310; 30; 20 MG/100ML; MG/100ML; MG/100ML; MG/100ML
9 INJECTION, SOLUTION INTRAVENOUS CONTINUOUS
Status: DISCONTINUED | OUTPATIENT
Start: 2023-11-22 | End: 2023-11-22 | Stop reason: HOSPADM

## 2023-11-22 RX ORDER — ONDANSETRON 2 MG/ML
4 INJECTION INTRAMUSCULAR; INTRAVENOUS ONCE AS NEEDED
Status: DISCONTINUED | OUTPATIENT
Start: 2023-11-22 | End: 2023-11-22 | Stop reason: HOSPADM

## 2023-11-22 RX ORDER — LIDOCAINE HYDROCHLORIDE 20 MG/ML
INJECTION, SOLUTION EPIDURAL; INFILTRATION; INTRACAUDAL; PERINEURAL AS NEEDED
Status: DISCONTINUED | OUTPATIENT
Start: 2023-11-22 | End: 2023-11-22 | Stop reason: SURG

## 2023-11-22 RX ORDER — HYDROMORPHONE HYDROCHLORIDE 1 MG/ML
0.5 INJECTION, SOLUTION INTRAMUSCULAR; INTRAVENOUS; SUBCUTANEOUS
Status: DISCONTINUED | OUTPATIENT
Start: 2023-11-22 | End: 2023-11-22 | Stop reason: HOSPADM

## 2023-11-22 RX ORDER — TRAMADOL HYDROCHLORIDE 50 MG/1
50 TABLET ORAL ONCE AS NEEDED
Status: DISCONTINUED | OUTPATIENT
Start: 2023-11-22 | End: 2023-11-22 | Stop reason: HOSPADM

## 2023-11-22 RX ORDER — LIDOCAINE HYDROCHLORIDE 10 MG/ML
0.5 INJECTION, SOLUTION INFILTRATION; PERINEURAL ONCE AS NEEDED
Status: DISCONTINUED | OUTPATIENT
Start: 2023-11-22 | End: 2023-11-22 | Stop reason: HOSPADM

## 2023-11-22 RX ORDER — PROMETHAZINE HYDROCHLORIDE 25 MG/1
25 TABLET ORAL ONCE AS NEEDED
Status: DISCONTINUED | OUTPATIENT
Start: 2023-11-22 | End: 2023-11-22 | Stop reason: HOSPADM

## 2023-11-22 RX ORDER — CLINDAMYCIN PHOSPHATE 900 MG/50ML
900 INJECTION INTRAVENOUS EVERY 8 HOURS
Status: COMPLETED | OUTPATIENT
Start: 2023-11-22 | End: 2023-11-22

## 2023-11-22 RX ORDER — PROPOFOL 10 MG/ML
INJECTION, EMULSION INTRAVENOUS AS NEEDED
Status: DISCONTINUED | OUTPATIENT
Start: 2023-11-22 | End: 2023-11-22 | Stop reason: SURG

## 2023-11-22 RX ORDER — ONDANSETRON 2 MG/ML
INJECTION INTRAMUSCULAR; INTRAVENOUS AS NEEDED
Status: DISCONTINUED | OUTPATIENT
Start: 2023-11-22 | End: 2023-11-22 | Stop reason: SURG

## 2023-11-22 RX ORDER — ONDANSETRON 4 MG/1
4 TABLET, FILM COATED ORAL DAILY PRN
Qty: 30 TABLET | Refills: 1 | Status: SHIPPED | OUTPATIENT
Start: 2023-11-22 | End: 2024-11-21

## 2023-11-22 RX ORDER — HYDRALAZINE HYDROCHLORIDE 20 MG/ML
5 INJECTION INTRAMUSCULAR; INTRAVENOUS
Status: DISCONTINUED | OUTPATIENT
Start: 2023-11-22 | End: 2023-11-22 | Stop reason: HOSPADM

## 2023-11-22 RX ADMIN — ONDANSETRON 4 MG: 2 INJECTION INTRAMUSCULAR; INTRAVENOUS at 07:59

## 2023-11-22 RX ADMIN — FENTANYL CITRATE 50 MCG: 50 INJECTION, SOLUTION INTRAMUSCULAR; INTRAVENOUS at 06:38

## 2023-11-22 RX ADMIN — PROPOFOL 150 MG: 10 INJECTION, EMULSION INTRAVENOUS at 07:07

## 2023-11-22 RX ADMIN — ROPIVACAINE HYDROCHLORIDE 10 ML: 5 INJECTION, SOLUTION EPIDURAL; INFILTRATION; PERINEURAL at 06:42

## 2023-11-22 RX ADMIN — SODIUM CHLORIDE, POTASSIUM CHLORIDE, SODIUM LACTATE AND CALCIUM CHLORIDE 9 ML/HR: 600; 310; 30; 20 INJECTION, SOLUTION INTRAVENOUS at 06:24

## 2023-11-22 RX ADMIN — MIDAZOLAM 1 MG: 1 INJECTION INTRAMUSCULAR; INTRAVENOUS at 06:39

## 2023-11-22 RX ADMIN — ROPIVACAINE HYDROCHLORIDE 20 ML: 5 INJECTION EPIDURAL; INFILTRATION; PERINEURAL at 06:39

## 2023-11-22 RX ADMIN — FAMOTIDINE 20 MG: 10 INJECTION INTRAVENOUS at 06:39

## 2023-11-22 RX ADMIN — DEXAMETHASONE SODIUM PHOSPHATE 8 MG: 4 INJECTION, SOLUTION INTRAMUSCULAR; INTRAVENOUS at 07:10

## 2023-11-22 RX ADMIN — SODIUM CHLORIDE, POTASSIUM CHLORIDE, SODIUM LACTATE AND CALCIUM CHLORIDE: 600; 310; 30; 20 INJECTION, SOLUTION INTRAVENOUS at 08:04

## 2023-11-22 RX ADMIN — CLINDAMYCIN IN 5 PERCENT DEXTROSE 900 MG: 18 INJECTION, SOLUTION INTRAVENOUS at 06:54

## 2023-11-22 RX ADMIN — DEXAMETHASONE SODIUM PHOSPHATE 4 MG: 4 INJECTION, SOLUTION INTRAMUSCULAR; INTRAVENOUS at 06:39

## 2023-11-22 RX ADMIN — LIDOCAINE HYDROCHLORIDE 100 MG: 20 INJECTION, SOLUTION EPIDURAL; INFILTRATION; INTRACAUDAL; PERINEURAL at 07:07

## 2023-11-22 NOTE — DISCHARGE INSTRUCTIONS
What to expect after a Nerve Block    Nerve blocks administered to block pain affect many types of nerves, including those nerves that control movement, pain, and normal sensation. Following a nerve block, you may notice some bruising at the site where the block was given. You may experience sensations such as: numbness of the affected area or limb, tingling, heaviness (that is the limb feels heavy to you), weakness or inability to move the affected arm or leg, or a feeling as if your arm or leg has “fallen asleep.”     A nerve block can last from 2 to 36 hours depending on the medications used.  Usually the weakness wears off first followed by the tingling and heaviness. As the block wears off, you may begin to notice pain; however, this sequence of events may occur in any order. Typically, you will be able to move your limb before you will feel it. Once a nerve block begins to wear off, the effects are usually completely gone within 60 minutes.  If you experience continued side effects that you believe are block related for longer than 48 hours, please call your healthcare provider. Please see block-specific instructions below.    Instructions for any block involving the shoulder or arm  If you have had any kind of shoulder/arm block, you will go home with your arm in a sling. Wear the sling until the block has completely worn off. You may be required to wear it for a longer period of time per your surgeon’s recommendations.  If you have had a shoulder/arm block, it is a good idea to sleep on a recliner with pillows under your arm.    You may experience symptoms such as:  Shortness of breath  Hoarseness   Blurry vision  Unequal pupils  Drooping of your face on the same side as the block was performed    These are side effects associated with this kind of block and should go away within 12 hours.    Note: If you have severe or prolonged shortness of breath, please seek medical assistance as soon as possible.      Protection of a “blocked” arm or leg (limb)  After a nerve block, you cannot feel pain, pressure, or extremes of temperature in the affected limb. And because of this, your blocked limb is at more risk for injury. For example, it is possible to burn your limb on an extremely hot surface without feeling it.     When resting, it is important to reposition your limb periodically to avoid prolonged pressure on it. This may require the use of pillows and padding.    While sleeping, you should avoid rolling onto the affected limb or putting too much pressure on it.     If you have a cast or tight dressing, check the color of your fingers or toes of the affected limb. Call your surgeon if they look discolored (that is, dusky, dark colored).    Use caution in cold weather. Cover your limb appropriately to protect it from the cold.      Pain Management:    Your surgeon will give you a prescription for pain medication. Begin taking this before the nerve block wears off. Bear in mind that sometimes the block can wear off in the middle of the night.          Pendulum Exercises for Post Op Shoulder Surgery      Lean over with your good arm supported on a table or chair.  Relax the injured arm and allow it to hang straight down at your side.  Slowly begin to swing the relaxed arm back and forth.  Then swing it side to side.  Next, move it in a [place]. Now,  reverse the direction.        Generally, you should spend about 5 minutes doing this exercise.  It should be done 2-3 times daily or as directed by your physician.

## 2023-11-22 NOTE — ANESTHESIA PROCEDURE NOTES
Peripheral Block      Patient reassessed immediately prior to procedure    Patient location during procedure: pre-op  Start time: 11/22/2023 6:39 AM  Stop time: 11/22/2023 6:42 AM  Reason for block: at surgeon's request and post-op pain management  Performed by  Anesthesiologist: Brittany Manjarrez MD  Preanesthetic Checklist  Completed: patient identified, IV checked, site marked, risks and benefits discussed, surgical consent, monitors and equipment checked, pre-op evaluation and timeout performed  Prep:  Pt Position: sitting  Sterile barriers:cap, gloves, mask and washed/disinfected hands  Prep: ChloraPrep  Patient monitoring: blood pressure monitoring, continuous pulse oximetry and EKG  Procedure    Sedation: yes  Performed under: local infiltration  Guidance:ultrasound guided    ULTRASOUND INTERPRETATION.  Using ultrasound guidance a 21 G gauge needle was placed in close proximity to the brachial plexus nerve, at which point, under ultrasound guidance anesthetic was injected in the area of the nerve and spread of the anesthesia was seen on ultrasound in close proximity thereto.  There were no abnormalities seen on ultrasound; a digital image was taken; and the patient tolerated the procedure with no complications. Images:still images obtained, printed/placed on chart    Laterality:right  Block Type:interscalene  Injection Technique:single-shot  Needle Type:echogenic  Resistance on Injection: none    Medications Used: ropivacaine (NAROPIN) 0.5 % injection - Injection   20 mL - 11/22/2023 6:39:00 AM  dexamethasone (DECADRON) injection - Injection   4 mg - 11/22/2023 6:39:00 AM      Post Assessment  Injection Assessment: negative aspiration for heme, no paresthesia on injection and incremental injection  Patient Tolerance:comfortable throughout block  Complications:no  Additional Notes  Ultrasound guidance used for verification of needle location and appropriate medication delivery.    Diagnosis: Acute  post-surgical pain of the right shoulder

## 2023-11-22 NOTE — OP NOTE
SHOULDER ARTHROSCOPY WITH ROTATOR CUFF REPAIR  Procedure Note    Amaya Zavala  11/22/2023    Pre-op Diagnosis:   1.  Right rotator cuff tear  2.  Labral tear  3.  Impingement    Post-op Diagnosis:     1.  Same  2.   3.     Procedure(s):  1.  Right shoulder arthroscopic rotator cuff repair with anchor x2  2.  Extensive debridement with acromioplasty  3.   4.     Surgeon(s):  Chas Toure MD    Anesthesia: General with Block  Anesthesiologist: Brittany Manjarrez MD  CRNA: Poornima Hunter CRNA    Staff:   Circulator: Edna Stinson RN  Scrub Person: Milena Jose  Vendor Representative: Rolando Palomino  Assistant: Aram Fisher PA-C      Drains: None    Estimated Blood Loss: minimal    Specimen: * No orders in the log *    Description of Procedure: I.  Following induction of anesthesia the patient was placed in the beachchair position.  Right shoulder was prepped and draped in a sterile fashion.  I injected the joint and created the posterior portal and inserted the cannula into the joint.  The paient was found to have fraying of the labrum as well as a full-thickness tear of the anterior supraspinatus.  I created the anterior portal and inserted the shaver and debrided the labrum back to stable tissue as well as debriding the undersurface of the rotator cuff.  Biceps tendon and anchor appeared to be normal.     II.  I then placed the camera in the subacromial space.  The pateint was found to have a large bony impinging lesion from the anterolateral acromion.  I created the lateral portal inserted the Arthrex thermal probe which I used to release soft tissue from the acromion and to release the coracoacromial ligament.  I then inserted the 4 mm bur and performed the acromioplasty removing approximately 11 to 12 mm of bone.     III.  With the arm in abduction and external rotation I then debrided the bursa and identified the full-thickness tear of the supraspinatus.  I debrided the atrophic  rotator cuff tissue resulted in around 3 cm full-thickness tear of the anterior supraspinatus.  I debrided the greater tuberosity down to bleeding cancellus bony surface and placed 2 Biomet 2.9 mm juggernaut suture anchor at the lateral margin of the tuberosity.  I shuttled one limb of each suture through the lateral edge of the tear which I then tied with an SMC sliding knot.  We achieved complete coverage of the tuberosity.  We then removed the cannulas and closed the portals with a 3-0 nylon.  She was then placed into a soft sterile dressing in her postop sling.  She will be discharged to recovery and then home and her prognosis is good.    Aram Fisher PA-C assisted me in this procedure.  His presence was necessary to help with holding the patient's limb and the camera.  He allowed me to perform the procedure in a much quicker fashion resulting in less morbidity and risk for the patient.  An extra set of skilled sugical hands was necessary to effectively perform this procedure.           Findings: See Dictation    Complications: None      Chas Toure MD     Date: 11/22/2023  Time: 08:04 EST

## 2023-11-22 NOTE — ANESTHESIA POSTPROCEDURE EVALUATION
Patient: Amaya Zavala    Procedure Summary       Date: 11/22/23 Room / Location:  [facility] [address] [place] [address] [facility]    Anesthesia Start: 0658 Anesthesia Stop: 0822    Procedure: RIGHT SHOULDER ARTHROSCOPY, EXTENSIVE DEBRIDEMENT, ROTATOR CUFF REPAIR (Right: Shoulder) Diagnosis:     Surgeons: Chas Toure MD Provider: Brittany Manjarrez MD    Anesthesia Type: general ASA Status: 2            Anesthesia Type: general    Vitals  Vitals Value Taken Time   /77 11/22/23 0930   Temp 36.9 °C (98.5 °F) 11/22/23 0927   Pulse 95 11/22/23 0933   Resp 16 11/22/23 0930   SpO2 90 % 11/22/23 0933   Vitals shown include unfiled device data.        Post Anesthesia Care and Evaluation    Patient location during evaluation: PHASE II  Patient participation: complete - patient participated  Level of consciousness: awake  Pain management: adequate    Airway patency: patent  Anesthetic complications: No anesthetic complications  PONV Status: none  Cardiovascular status: stable  Respiratory status: acceptable  Hydration status: acceptable

## 2023-11-22 NOTE — ANESTHESIA PROCEDURE NOTES
Peripheral Block, Intercostobrachial      Patient reassessed immediately prior to procedure    Patient location during procedure: pre-op  Start time: 11/22/2023 6:42 AM  Stop time: 11/22/2023 6:45 AM  Reason for block: at surgeon's request and post-op pain management  Performed by  Anesthesiologist: Brittany Manjarrez MD  Preanesthetic Checklist  Completed: patient identified, IV checked, site marked, risks and benefits discussed, surgical consent, monitors and equipment checked, pre-op evaluation and timeout performed  Prep:  Pt Position: sitting  Sterile barriers:cap, gloves, mask and washed/disinfected hands  Prep: ChloraPrep  Patient monitoring: blood pressure monitoring, continuous pulse oximetry and EKG  Procedure    Sedation: yes    Guidance:ultrasound guided    Needle gauge: 25 g. Images:still images obtained, printed/placed on chart    Laterality:right  Anesthesia block type: Intercostal brachial nerve block.  Injection Technique:single-shot  Needle Type:echogenic  Resistance on Injection: none    Medications Used: ropivacaine (NAROPIN) 0.5 % injection - Injection   10 mL - 11/22/2023 6:42:00 AM      Post Assessment  Injection Assessment: negative aspiration for heme, no paresthesia on injection and incremental injection  Patient Tolerance:comfortable throughout block  Complications:no  Additional Notes  Diagnosis: Acute pain of the right medial upper arm (axilla) in the distribution not covered by the brachial plexus    Ultrasound guidance used for safe needle positioning and incremental local anesthetic injection

## 2023-11-22 NOTE — ANESTHESIA PREPROCEDURE EVALUATION
Anesthesia Evaluation     Patient summary reviewed and Nursing notes reviewed   no history of anesthetic complications:   NPO Solid Status: > 8 hours  NPO Liquid Status: > 2 hours           Airway   Mallampati: III  TM distance: >3 FB  Neck ROM: full  Possible difficult intubation  Dental - normal exam     Pulmonary - normal exam   (+) asthma,  (-) COPD  Cardiovascular   Exercise tolerance: good (4-7 METS)    ECG reviewed  Rhythm: regular    (+) hypertension, hyperlipidemia  (-) past MI, angina, cardiac stents      Neuro/Psych  (+) TIA  (-) seizures, CVA  GI/Hepatic/Renal/Endo    (+) GERD well controlled, diabetes mellitus type 2  (-) liver disease, no renal disease    Musculoskeletal     Abdominal    Substance History - negative use     OB/GYN          Other - negative ROS                     Anesthesia Plan    ASA 2     general     (Plan for PNB for post-op pain management    I have reviewed the patient's history and chart with the patient, including all pertinent laboratory results and imaging. I have explained the risks of anesthesia including but not limited to dental or airway injury, nausea, and cardio-pulmonary complications, such as aspiration, MI, stroke, or death. Patient has agreed to proceed.     Risks of peripheral nerve block were discussed with patient including but not limited to: inadequate block, bleeding, infection, nerve injury, and need to protect limb while numb.    VITALS:  /82 (BP Location: Right arm, Patient Position: Sitting)   Pulse 88   Temp 36.9 °C (98.5 °F) (Oral)   Resp 16   Wt 79.4 kg (175 lb)   SpO2 99%   BMI 28.25 kg/m² )  intravenous induction     Anesthetic plan, risks, benefits, and alternatives have been provided, discussed and informed consent has been obtained with: patient.  Pre-procedure education provided    CODE STATUS:

## 2023-11-22 NOTE — ANESTHESIA PROCEDURE NOTES
Airway  Urgency: elective    Date/Time: 11/22/2023 7:08 AM  Airway not difficult    General Information and Staff    Patient location during procedure: OR  Anesthesiologist: Brittany Manjarrez MD  CRNA/CAA: Poornima Hunter CRNA    Indications and Patient Condition  Indications for airway management: airway protection    Preoxygenated: yes  MILS maintained throughout  Mask difficulty assessment: 0 - not attempted    Final Airway Details  Final airway type: supraglottic airway      Successful airway: classic  Size 4     Number of attempts at approach: 1  Assessment: lips, teeth, and gum same as pre-op and atraumatic intubation

## (undated) DEVICE — BUR SHAVER CLEARCUT 12FLUT 5MM 13CM

## (undated) DEVICE — BLD SHAVER BONECUTTER 5MM 13CM

## (undated) DEVICE — IMMOB SHLDR PAD2 UNIV SM

## (undated) DEVICE — GLV SURG SIGNATURE ESSENTIAL PF LTX SZ8

## (undated) DEVICE — ABL ASP APOLLO RF XL 90D

## (undated) DEVICE — GLV SURG BIOGEL LTX PF 8

## (undated) DEVICE — BLANKT WARM LOWR/BDY 100X120CM

## (undated) DEVICE — CLEAR-TRAC 7.0 MM X 72 MM THREADED                                    CANNULA, WITH DISPOSABLE OBTURATOR,                                    GREY, STERILE: Brand: CLEAR-TRAC

## (undated) DEVICE — TBG PUMP ARTHSCP MAIN AR6400 16FT

## (undated) DEVICE — DRSNG GZ PETROLTM XEROFORM CURAD 1X8IN STRL

## (undated) DEVICE — PK ARTHSCP SHLDR TOWER 40

## (undated) DEVICE — SKIN PREP TRAY W/CHG: Brand: MEDLINE INDUSTRIES, INC.

## (undated) DEVICE — BIT DRL JUGGERKNOT 2.9MM

## (undated) DEVICE — TP NDL SCORPION MULTIFIRE

## (undated) DEVICE — SUT PROLN 3/0 FS2 18IN 8665G

## (undated) DEVICE — CANNULA THREADED DISP 8.5 X 72MM: Brand: CLEAR-TRAC

## (undated) DEVICE — DRAPE,SHOULDER,BEACH ULTRAGARD: Brand: MEDLINE